# Patient Record
Sex: FEMALE | Race: OTHER | HISPANIC OR LATINO
[De-identification: names, ages, dates, MRNs, and addresses within clinical notes are randomized per-mention and may not be internally consistent; named-entity substitution may affect disease eponyms.]

---

## 2021-03-30 ENCOUNTER — RESULT REVIEW (OUTPATIENT)
Age: 41
End: 2021-03-30

## 2021-03-31 PROBLEM — Z00.00 ENCOUNTER FOR PREVENTIVE HEALTH EXAMINATION: Status: ACTIVE | Noted: 2021-03-31

## 2021-04-21 ENCOUNTER — APPOINTMENT (OUTPATIENT)
Dept: BREAST CENTER | Facility: CLINIC | Age: 41
End: 2021-04-21
Payer: COMMERCIAL

## 2021-04-21 VITALS
BODY MASS INDEX: 27.48 KG/M2 | WEIGHT: 140 LBS | SYSTOLIC BLOOD PRESSURE: 117 MMHG | HEIGHT: 60 IN | DIASTOLIC BLOOD PRESSURE: 75 MMHG | HEART RATE: 56 BPM

## 2021-04-21 DIAGNOSIS — N63.10 UNSPECIFIED LUMP IN THE RIGHT BREAST, UNSPECIFIED QUADRANT: ICD-10-CM

## 2021-04-21 DIAGNOSIS — Z83.3 FAMILY HISTORY OF DIABETES MELLITUS: ICD-10-CM

## 2021-04-21 DIAGNOSIS — Z78.9 OTHER SPECIFIED HEALTH STATUS: ICD-10-CM

## 2021-04-21 DIAGNOSIS — N64.4 MASTODYNIA: ICD-10-CM

## 2021-04-21 DIAGNOSIS — Z86.69 PERSONAL HISTORY OF OTHER DISEASES OF THE NERVOUS SYSTEM AND SENSE ORGANS: ICD-10-CM

## 2021-04-21 DIAGNOSIS — R92.2 INCONCLUSIVE MAMMOGRAM: ICD-10-CM

## 2021-04-21 PROCEDURE — 99072 ADDL SUPL MATRL&STAF TM PHE: CPT

## 2021-04-21 PROCEDURE — 99204 OFFICE O/P NEW MOD 45 MIN: CPT

## 2021-04-21 NOTE — HISTORY OF PRESENT ILLNESS
[FreeTextEntry1] : This is a 40 year old female referred by Dr. Yanez for right breast pain. She states the pain started January 2021. The pain is UOQ right breast and she notes a breast mass/hardness under the area of pain. The pain a few times a week and is worse before her period. She started more cardio recently.  Her periods are becoming irregular. She has decreased caffeine intake 2 months ago.\par \par She does SBE. \par She not noticed a change in her right breast or a right  breast lump. Gets worse before period.\par She has not noticed a change in her nipple or nipple area.\par She has not noticed a change in the skin of the breast.\par She is not experiencing nipple discharge.\par She is experiencing right  breast pain.\par She has not noticed a lump or lymph node under the armpit. \par \par BREAST CANCER RISK FACTORS\par Menarche: 13\par Date of LMP: 4/2021\par Menopause: pre\par Grav:  5    Para: 2\par Age at first live birth: 23\par Nursed: Y both children\par Hysterectomy: N \par Oophorectomy: N \par OCP: yes for 4 years in the past \par HRT: N \par Last pap/pelvic exam: 4/1/2021 WNL \par Related family history: none \par Ashkenazi: N \par Mastery risk assessment: BRCAPRO 8.9%, TCv7 8.6%, TCv8 8.6%, Claire 9.7%\par BRCA testing: N \par Bra size:  36C \par \par Last mammogram: 1/26/2021    Baseline Mammo      Location: [facility]\par Report reviewed.                                 Images reviewed.\par Results: Birads 0\par Heterogeneously dense.\par Likely parenchymal densities bilaterally\par No suspicious findings\par No definite abnormality right UOQ in area of palpable concern. Rec bilateral u/s\par \par Last ultrasound:    1/26/2021                               Location: [facility]\par Report reviewed.                                 Images reviewed. \par Results:Birads 2\par No evidence of malignancy.\par No suspicious findings in are of palpable concern right breast.\par \par Last MRI:                                             Location:\par Report reviewed.\par

## 2021-04-21 NOTE — PHYSICAL EXAM
[Normocephalic] : normocephalic [Atraumatic] : atraumatic [Supple] : supple [No Supraclavicular Adenopathy] : no supraclavicular adenopathy [Examined in the supine and seated position] : examined in the supine and seated position [Symmetrical] : symmetrical [No dominant masses] : no dominant masses in right breast  [No dominant masses] : no dominant masses left breast [No Nipple Retraction] : no left nipple retraction [No Nipple Discharge] : no left nipple discharge [No Axillary Lymphadenopathy] : no left axillary lymphadenopathy [No Edema] : no edema [No Rashes] : no rashes [No Ulceration] : no ulceration [de-identified] : lumpy bumpy bilaterally especially upper outer quadrants [de-identified] : in area of patient's concern UOQ there is a ridge of breast tissue which is firm, but follows in a line, no suspicious masses [de-identified] : lumpy bumpy UOQ

## 2021-08-04 ENCOUNTER — APPOINTMENT (OUTPATIENT)
Dept: BREAST CENTER | Facility: CLINIC | Age: 41
End: 2021-08-04

## 2024-07-28 PROBLEM — Z86.69 HISTORY OF BELL'S PALSY: Status: RESOLVED | Noted: 2021-04-21 | Resolved: 2024-07-28

## 2025-09-03 ENCOUNTER — APPOINTMENT (OUTPATIENT)
Dept: OBGYN | Facility: CLINIC | Age: 45
End: 2025-09-03
Payer: COMMERCIAL

## 2025-09-03 PROCEDURE — 76830 TRANSVAGINAL US NON-OB: CPT | Mod: 26
